# Patient Record
Sex: MALE | Race: BLACK OR AFRICAN AMERICAN
[De-identification: names, ages, dates, MRNs, and addresses within clinical notes are randomized per-mention and may not be internally consistent; named-entity substitution may affect disease eponyms.]

---
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
## 2020-08-04 ENCOUNTER — HOSPITAL ENCOUNTER (EMERGENCY)
Dept: HOSPITAL 53 - M ED | Age: 20
LOS: 1 days | Discharge: HOME | End: 2020-08-05
Payer: COMMERCIAL

## 2020-08-04 DIAGNOSIS — N20.1: Primary | ICD-10-CM

## 2020-08-04 DIAGNOSIS — R11.2: ICD-10-CM

## 2020-08-04 PROCEDURE — 87086 URINE CULTURE/COLONY COUNT: CPT

## 2020-08-04 PROCEDURE — 96374 THER/PROPH/DIAG INJ IV PUSH: CPT

## 2020-08-04 PROCEDURE — 85025 COMPLETE CBC W/AUTO DIFF WBC: CPT

## 2020-08-04 PROCEDURE — 83690 ASSAY OF LIPASE: CPT

## 2020-08-04 PROCEDURE — 96361 HYDRATE IV INFUSION ADD-ON: CPT

## 2020-08-04 PROCEDURE — 74177 CT ABD & PELVIS W/CONTRAST: CPT

## 2020-08-04 PROCEDURE — 80076 HEPATIC FUNCTION PANEL: CPT

## 2020-08-04 PROCEDURE — 99283 EMERGENCY DEPT VISIT LOW MDM: CPT

## 2020-08-04 PROCEDURE — 81001 URINALYSIS AUTO W/SCOPE: CPT

## 2020-08-04 PROCEDURE — 80048 BASIC METABOLIC PNL TOTAL CA: CPT

## 2020-08-04 PROCEDURE — 96375 TX/PRO/DX INJ NEW DRUG ADDON: CPT

## 2020-09-20 LAB
AMORPH SED URNS QL MICRO: (no result)
APPEARANCE UR: (no result)
BACTERIA UR QL AUTO: NEGATIVE
BASOPHILS # BLD AUTO: 0 10^3/UL (ref 0–0.2)
BASOPHILS NFR BLD AUTO: 0.3 % (ref 0–1)
BILIRUB UR QL STRIP.AUTO: NEGATIVE
EOSINOPHIL # BLD AUTO: 0 10^3/UL (ref 0–0.5)
EOSINOPHIL NFR BLD AUTO: 0.1 % (ref 0–3)
GLUCOSE UR QL STRIP.AUTO: NEGATIVE MG/DL
HCT VFR BLD AUTO: 45.1 % (ref 42–52)
HGB BLD-MCNC: 15.4 G/DL (ref 13.5–17.5)
HGB UR QL STRIP.AUTO: NEGATIVE
KETONES UR QL STRIP.AUTO: NEGATIVE MG/DL
LEUKOCYTE ESTERASE UR QL STRIP.AUTO: NEGATIVE
LYMPHOCYTES # BLD AUTO: 1.3 10^3/UL (ref 1.5–5)
LYMPHOCYTES NFR BLD AUTO: 14.2 % (ref 24–44)
MCH RBC QN AUTO: 26.5 PG (ref 27–33)
MCHC RBC AUTO-ENTMCNC: 34.1 G/DL (ref 32–36.5)
MCV RBC AUTO: 77.5 FL (ref 80–96)
MONOCYTES # BLD AUTO: 0.4 10^3/UL (ref 0–0.8)
MONOCYTES NFR BLD AUTO: 4 % (ref 0–5)
MUCOUS THREADS URNS QL MICRO: (no result)
NEUTROPHILS # BLD AUTO: 7.3 10^3/UL (ref 1.5–8.5)
NEUTROPHILS NFR BLD AUTO: 81.2 % (ref 36–66)
NITRITE UR QL STRIP.AUTO: NEGATIVE
PH UR STRIP.AUTO: 9 UNITS (ref 5–9)
PLATELET # BLD AUTO: 197 10^3/UL (ref 150–450)
PROT UR QL STRIP.AUTO: (no result) MG/DL
RBC # BLD AUTO: 5.82 10^6/UL (ref 4.3–6.1)
RBC # UR AUTO: 3 /HPF (ref 0–3)
SP GR UR STRIP.AUTO: 1.02 (ref 1–1.03)
SQUAMOUS #/AREA URNS AUTO: 0 /HPF (ref 0–6)
UROBILINOGEN UR QL STRIP.AUTO: 0.2 MG/DL (ref 0–2)
WBC # BLD AUTO: 9 10^3/UL (ref 4–10)
WBC #/AREA URNS AUTO: 0 /HPF (ref 0–3)

## 2020-09-29 LAB
ALBUMIN SERPL BCG-MCNC: 5.1 GM/DL (ref 3.2–5.2)
ALT SERPL W P-5'-P-CCNC: 14 U/L (ref 12–78)
BILIRUB CONJ SERPL-MCNC: 0.1 MG/DL (ref 0–0.2)
BILIRUB SERPL-MCNC: 0.5 MG/DL (ref 0.2–1)
BUN SERPL-MCNC: 17 MG/DL (ref 7–18)
CALCIUM SERPL-MCNC: 9.8 MG/DL (ref 8.5–10.1)
CHLORIDE SERPL-SCNC: 103 MEQ/L (ref 98–107)
CO2 SERPL-SCNC: 28 MEQ/L (ref 21–32)
CREAT SERPL-MCNC: 1.09 MG/DL (ref 0.7–1.3)
GLUCOSE SERPL-MCNC: 105 MG/DL (ref 70–100)
LIPASE SERPL-CCNC: 88 U/L (ref 73–393)
POTASSIUM SERPL-SCNC: 3.5 MEQ/L (ref 3.5–5.1)
PROT SERPL-MCNC: 8.9 GM/DL (ref 6.4–8.2)
SODIUM SERPL-SCNC: 137 MEQ/L (ref 136–145)